# Patient Record
Sex: MALE | Race: WHITE | NOT HISPANIC OR LATINO
[De-identification: names, ages, dates, MRNs, and addresses within clinical notes are randomized per-mention and may not be internally consistent; named-entity substitution may affect disease eponyms.]

---

## 2019-06-26 ENCOUNTER — TRANSCRIPTION ENCOUNTER (OUTPATIENT)
Age: 74
End: 2019-06-26

## 2019-06-27 ENCOUNTER — OUTPATIENT (OUTPATIENT)
Dept: OUTPATIENT SERVICES | Facility: HOSPITAL | Age: 74
LOS: 1 days | End: 2019-06-27
Payer: MEDICARE

## 2019-06-27 VITALS
DIASTOLIC BLOOD PRESSURE: 84 MMHG | HEIGHT: 72 IN | SYSTOLIC BLOOD PRESSURE: 141 MMHG | OXYGEN SATURATION: 99 % | RESPIRATION RATE: 13 BRPM | WEIGHT: 215.17 LBS | TEMPERATURE: 98 F | HEART RATE: 52 BPM

## 2019-06-27 VITALS
SYSTOLIC BLOOD PRESSURE: 134 MMHG | HEART RATE: 52 BPM | RESPIRATION RATE: 13 BRPM | OXYGEN SATURATION: 99 % | DIASTOLIC BLOOD PRESSURE: 76 MMHG

## 2019-06-27 DIAGNOSIS — Z90.89 ACQUIRED ABSENCE OF OTHER ORGANS: Chronic | ICD-10-CM

## 2019-06-27 DIAGNOSIS — Z98.890 OTHER SPECIFIED POSTPROCEDURAL STATES: Chronic | ICD-10-CM

## 2019-06-27 DIAGNOSIS — H35.371 PUCKERING OF MACULA, RIGHT EYE: ICD-10-CM

## 2019-06-27 PROCEDURE — 67042 VIT FOR MACULAR HOLE: CPT | Mod: RT

## 2019-06-27 NOTE — ASU DISCHARGE PLAN (ADULT/PEDIATRIC) - CARE PROVIDER_API CALL
Srinivas Douglas (MD)  Ophthalmology  [address] [place] [address], [address]  [place] [address]  Phone: [phone]  Fax: [phone]  Follow Up Time:

## 2019-06-27 NOTE — ASU DISCHARGE PLAN (ADULT/PEDIATRIC) - CALL YOUR DOCTOR IF YOU HAVE ANY OF THE FOLLOWING:
Swelling that gets worse/Fever greater than (need to indicate Fahrenheit or Celsius)/Nausea and vomiting that does not stop/Bleeding that does not stop/Pain not relieved by Medications Prophylactic measure

## 2021-11-21 ENCOUNTER — TRANSCRIPTION ENCOUNTER (OUTPATIENT)
Age: 76
End: 2021-11-21

## 2022-05-03 ENCOUNTER — NON-APPOINTMENT (OUTPATIENT)
Age: 77
End: 2022-05-03

## 2022-09-28 PROBLEM — N40.0 BENIGN PROSTATIC HYPERPLASIA WITHOUT LOWER URINARY TRACT SYMPTOMS: Chronic | Status: ACTIVE | Noted: 2019-06-27

## 2022-09-28 PROBLEM — Z00.00 ENCOUNTER FOR PREVENTIVE HEALTH EXAMINATION: Status: ACTIVE | Noted: 2022-09-28

## 2022-09-28 PROBLEM — E03.9 HYPOTHYROIDISM, UNSPECIFIED: Chronic | Status: ACTIVE | Noted: 2019-06-27

## 2022-09-28 PROBLEM — E78.5 HYPERLIPIDEMIA, UNSPECIFIED: Chronic | Status: ACTIVE | Noted: 2019-06-27

## 2022-09-28 PROBLEM — K63.5 POLYP OF COLON: Chronic | Status: ACTIVE | Noted: 2019-06-27

## 2022-10-11 ENCOUNTER — APPOINTMENT (OUTPATIENT)
Dept: PAIN MANAGEMENT | Facility: CLINIC | Age: 77
End: 2022-10-11

## 2022-10-11 VITALS — WEIGHT: 220 LBS | HEIGHT: 72 IN | BODY MASS INDEX: 29.8 KG/M2

## 2022-10-11 DIAGNOSIS — M47.816 SPONDYLOSIS W/OUT MYELOPATHY OR RADICULOPATHY, LUMBAR REGION: ICD-10-CM

## 2022-10-11 PROCEDURE — 99214 OFFICE O/P EST MOD 30 MIN: CPT

## 2022-10-12 NOTE — PHYSICAL EXAM
[de-identified] : Constitutional:\par - No acute distress\par - Well developed; well nourished\par \par Neurological:\par - normal mood and affect\par - alert and oriented x 3\par \par Cardiovascular\par - grossly normal\par \par Lumbar Spine Exam:\par \par Inspection:\par erythema (-)\par ecchymosis (-)\par rashes (-)\par alignment: no scoliosis\par \par Palpation:\par paraspinal tenderness: L (-) ; R (-)\par thoracic paraspinal tenderness: L (-) ; R (-)\par sciatic nerve tenderness : L (-) ; R (-)\par SI joint tenderness: L (-) ; R (-)\par GTB tenderness: L (-); R (-)\par \par ROM:\par Reduced ROM with mild stiffness\par Pain at extremes of extension\par \par Strength: Right/Left\par Hip Flexion: (5/5) (5/5)\par Quadriceps: (5/5) (5/5)\par Hamstrings: (5/5) (5/5)\par Ankle Dorsiflexion: (5/5) (5/5)\par EHL: (5/5) (5/5)\par Ankle Plantarflexion: (5/5) (5/5)\par \par Special Tests:\par SLR: R (-) ; L (-)\par Facet loading: R (+) ; L (+)\par LAWSON test: R (-) ; L (-)\par Tight Hamstrings R (+) ; L (+)\par Tai testing: R (+) ; L (+)\par \par Neurologic:\par Light touch intact throughout LE bilaterally\par Reflexes normal and symmetric bilaterally\par \par Gait:\par Mildly antalgic gait; stooped forward posture\par ambulates without assistive device

## 2022-10-12 NOTE — HISTORY OF PRESENT ILLNESS
[Gradual] : gradual [3] : 3 [0] : 0 [Radiating] : radiating [Sharp] : sharp [Tightness] : tightness [Intermittent] : intermittent [Sleep] : sleep [Massage] : massage [Lying in bed] : lying in bed [Retired] : Work status: retired [FreeTextEntry1] : 10/11/2022 - Patient presents today for a follow-up visit, last seen Dec. 2021. Patient complains of back pain which radiates to his left lower extremity. He notes associated numbness to his bilateral toes. He reports that he is doing home exercises every day.  Reports significant benefit with prior JOHN and wishes to be considered for repeat.\par \par 12/21/21 - Patient presents for a FUV following a L4-5 LESI on 11/24/21. Patient reports that his pain was resolved\par following his last LESI. Patient reports that he completed PT, and has transitioned to HEP. Patient reports that he may feel some residual pain in the lower back while lifting heavy objects. Patient reports that he has been dieting and trying to lose weight. Patient reports that he has been working with his PCP regarding his blood pressure. \par \par 11/09/21 - Patient presents for initial evaluation. Patient c/o lower back pain radiating into his lower extremities. Patient reports that he has previously trialed PT which he found was exacerbating his pain. Patient has been taking meloxicam which he found reduces the quality of his pain. Patient reports his pain was exacerbated this past summer. \par \par Previous Injections:\par 1) L4-5 LESI (11/24/21)\par \par Pertinent Surgical History: N/A\par \par Imaging:\par 1)  Lumbar Spine (10/20/21) - [facility]\par \par L1-L2: There is disc bulging, bony ridging, facet arthrosis, mild central stenosis and right exiting nerve root impingement.\par L2-L3: There is disc bulging, bony ridging, facet arthrosis and moderate central stenosis with left L3 and left exiting L2 nerve root impingement.\par L3-L4: There is disc bulging, bony ridging, facet arthrosis, mild central stenosis and impingement upon the right exiting L3 nerve root.\par L4-L5: There is disc bulging, bony ridging, facet arthrosis, mild central stenosis and left greater than right exiting L4 nerve root impingement.\par L5-S1: There is anterolisthesis, disc bulging, bony ridging, facet arthrosis and moderate central stenosis with bilateral exiting L5 nerve root impingement. There are synovial cysts associated with facet arthrosis contributing to central stenosis and foraminal narrowing bilaterally.\par \par Physician Disclaimer: I have personally reviewed and confirmed all HPI data with the patient. [] : no [FreeTextEntry3] : 6/2022 [FreeTextEntry5] : Patient states no known cause of injury. Patient has ongoing pain but recently became worse over the summer [FreeTextEntry6] : numbness [FreeTextEntry7] : b/l legs and feet [FreeTextEntry9] : home exercises [de-identified] : 12/21/21 [de-identified] : DR. Jose [de-identified] : CT of abdomen and pelvis done at [facility] [de-identified] : 11/24/21

## 2022-10-12 NOTE — ASSESSMENT
[FreeTextEntry1] : A thorough discussion occurred regarding available pain management treatment options including inte rehabilitative, pharmacological, and alternative modalities with the patient. We will proceed with the following:\par \par Interventional treatment options:\par - proceed with repeat L4-5 LESI (80 mg Kenalog) with fluoroscopic guidance \par - If inadequate relief would likely consider facet directed intervention for ongoing axial lower back pain \par - see additional instructions below\par \par Rehabilitative options:\par - Completed prior physical therapy trials \par - Continue HEP as tolerated ; home exercise sheet provided \par - see additional instructions below\par \par Medication based treatment options:\par - continue OTC NSAIDs PRN basis \par - see additional instructions below\par \par Complementary treatment options:\par - Weight management and lifestyle modifications discussed\par \par Additional treatment recommendations as follows:\par - Follow up 1-2 weeks post injection for assessment of efficacy and further recommendations.\par - advised patient to follow up with urology as recommended given CT findings\par \par The risks, benefits and alternatives of the proposed procedure were explained in detail with the patient. The risks outlined include but are not limited to infection, bleeding, post- dural puncture headache, nerve injury, a temporary increase in pain, failure to resolve symptoms, allergic reaction, and possible elevation of blood sugar in diabetics if using corticosteroid.  All questions were answered to patient's apparent satisfaction and he/she verbalized an understanding.\par \par We have discussed the risks, benefits, and alternatives NSAID therapy including but not limited to the risk of bleeding, thrombosis, gastric mucosal irritation/ulceration, allergic reaction and kidney dysfunction; the patient verbalizes an understanding.\par \par I, Madina Edmond, acting as scribe, attest that this documentation has been prepared under the direction and in the presence of Provider Sylvain Jose DO.\par \par The documentation recorded by the scribe, in my presence, accurately reflects the service I personally performed and the decisions made by me with my edits as appropriate.

## 2022-10-19 ENCOUNTER — APPOINTMENT (OUTPATIENT)
Dept: PAIN MANAGEMENT | Facility: CLINIC | Age: 77
End: 2022-10-19

## 2022-10-19 DIAGNOSIS — M54.16 RADICULOPATHY, LUMBAR REGION: ICD-10-CM

## 2022-10-19 DIAGNOSIS — M48.061 SPINAL STENOSIS, LUMBAR REGION WITHOUT NEUROGENIC CLAUDICATION: ICD-10-CM

## 2022-10-19 PROCEDURE — 62323 NJX INTERLAMINAR LMBR/SAC: CPT

## 2022-10-19 NOTE — PROCEDURE
[FreeTextEntry3] : Date of Service: 10/19/2022 \par \par Account: [id number]\par \par Patient: OTIS JENKINS \par \par YOB: 1945\par \par Age: 76 year\par \par Surgeon:      Sylvain Jose DO\par \par Assistant:    None\par \par Pre-Operative Diagnosis:         Lumbosacral Radiculitis (M54.17)\par \par Post Operative Diagnosis:       Lumbosacral Radiculitis (M54.17)   \par \par Procedure:             Lumbar interlaminar (L5-S1) epidural steroid injection under fluoroscopic guidance\par \par Anesthesia:            MAC\par \par This procedure was carried out using fluoroscopic guidance.  The risks and benefits of the procedure were discussed extensively with the patient.  The consent of the patient was obtained and the following procedure was performed.  A timeout was performed with all essential staff present and the site and side were verified.\par \par The patient was placed in the prone position with a pillow under the abdomen to minimize the lumbar lordosis.  The lumbar area was prepped and draped in a sterile fashion.  Under A/P view with slight cephalad-caudad angulation, the L5-S1 interspace was identified and marked.  Using sterile technique the superficial skin was anesthetized with 1% Lidocaine.  A 20 gauge Tuohy needle was advanced into the epidural space under fluoroscopy using loss of resistance at the L5-S1 level.  After negative aspiration for heme or CSF, an epidurogram was obtained in the A/P and lateral fluoroscopic views using 2-3 cc of Omnipaque contrast confirming epidural placement of the needle.  After this, 5 cc of of a mixture of preservative free normal saline and 80 mg of Kenalog were injected into the epidural space. \par \par The needle was subsequently removed.  Vital signs remained normal.  Pulse oximeter was used throughout the procedure and the patient's pulse and oxygen saturation remained within normal limits.  The patient tolerated the procedure well.  There were no complications.  The patient was instructed to apply ice over the injection sites for twenty minutes every two hours for the next 24 to 48 hours.\par \par Disposition:\par      1. The patient was advised to F/U in 1-2 weeks to assess the response to the injection.\par      2. The patient was also instructed to contact me immediately if there were any concerns related to the procedure performed.

## 2022-11-01 ENCOUNTER — APPOINTMENT (OUTPATIENT)
Dept: PAIN MANAGEMENT | Facility: CLINIC | Age: 77
End: 2022-11-01

## 2023-08-08 ENCOUNTER — NON-APPOINTMENT (OUTPATIENT)
Age: 78
End: 2023-08-08

## 2023-08-09 ENCOUNTER — APPOINTMENT (OUTPATIENT)
Dept: DERMATOLOGY | Facility: CLINIC | Age: 78
End: 2023-08-09
Payer: MEDICARE

## 2023-08-09 PROCEDURE — 17000 DESTRUCT PREMALG LESION: CPT | Mod: 59

## 2023-08-09 PROCEDURE — 17110 DESTRUCTION B9 LES UP TO 14: CPT

## 2023-08-09 PROCEDURE — 99203 OFFICE O/P NEW LOW 30 MIN: CPT | Mod: 25

## 2023-10-03 ENCOUNTER — NON-APPOINTMENT (OUTPATIENT)
Age: 78
End: 2023-10-03

## 2024-08-12 ENCOUNTER — APPOINTMENT (OUTPATIENT)
Dept: DERMATOLOGY | Facility: CLINIC | Age: 79
End: 2024-08-12
Payer: MEDICARE

## 2024-08-12 PROCEDURE — 99213 OFFICE O/P EST LOW 20 MIN: CPT

## 2025-08-04 ENCOUNTER — NON-APPOINTMENT (OUTPATIENT)
Age: 80
End: 2025-08-04

## 2025-08-06 ENCOUNTER — APPOINTMENT (OUTPATIENT)
Dept: DERMATOLOGY | Facility: CLINIC | Age: 80
End: 2025-08-06
Payer: MEDICARE

## 2025-08-06 PROCEDURE — 99213 OFFICE O/P EST LOW 20 MIN: CPT
